# Patient Record
Sex: FEMALE | Race: WHITE | URBAN - METROPOLITAN AREA
[De-identification: names, ages, dates, MRNs, and addresses within clinical notes are randomized per-mention and may not be internally consistent; named-entity substitution may affect disease eponyms.]

---

## 2018-03-17 ENCOUNTER — OFFICE VISIT (OUTPATIENT)
Dept: FAMILY MEDICINE CLINIC | Age: 33
End: 2018-03-17
Payer: COMMERCIAL

## 2018-03-17 VITALS
DIASTOLIC BLOOD PRESSURE: 64 MMHG | SYSTOLIC BLOOD PRESSURE: 104 MMHG | HEIGHT: 68 IN | HEART RATE: 103 BPM | RESPIRATION RATE: 12 BRPM | OXYGEN SATURATION: 98 % | BODY MASS INDEX: 24.1 KG/M2 | WEIGHT: 159 LBS | TEMPERATURE: 99.2 F

## 2018-03-17 DIAGNOSIS — H65.01 RIGHT ACUTE SEROUS OTITIS MEDIA, RECURRENCE NOT SPECIFIED: Primary | ICD-10-CM

## 2018-03-17 DIAGNOSIS — J01.90 ACUTE BACTERIAL SINUSITIS: ICD-10-CM

## 2018-03-17 DIAGNOSIS — B96.89 ACUTE BACTERIAL SINUSITIS: ICD-10-CM

## 2018-03-17 DIAGNOSIS — R05.9 COUGH: ICD-10-CM

## 2018-03-17 PROCEDURE — 99213 OFFICE O/P EST LOW 20 MIN: CPT | Performed by: PHYSICIAN ASSISTANT

## 2018-03-17 RX ORDER — CEFDINIR 300 MG/1
300 CAPSULE ORAL 2 TIMES DAILY
Qty: 20 CAPSULE | Refills: 0 | Status: SHIPPED | OUTPATIENT
Start: 2018-03-17 | End: 2018-03-27

## 2018-03-17 RX ORDER — LEVOTHYROXINE SODIUM 0.07 MG/1
75 TABLET ORAL DAILY
COMMUNITY

## 2018-03-17 NOTE — PROGRESS NOTES
and bulging, left TM with mild erythema , no perforation bilaterally. Canals normal bilaterally. Some mild bilateral serous fluid noted. Nose:   There is mild bilateral injection to middle turbinates bilaterally, turbinates swollen with some thick yellow mucous noted. Some frontal and maxillary sinus tenderness bilaterally. Mouth: Normal to inspection. Throat: Mild posterior pharyngeal erythema with some thick discolored post nasal drip present. No exudate. Neck:  Supple. There is some tender anterior cervical adenopathy. No rigidity and no stridor  Lungs:   Breath sounds: Non-labored, equal, and without adventitious sounds. No current active wheezing, rales, or rhonchi. Intermittent dry cough noted on exam, no sternal retractions. Heart:  Regular rate and rhythm, normal heart sounds  Skin:  Normal turgor. Warm, dry, without visible rash. Neurological:  Alert and oriented. Motor functions intact. Responds to verbal commands. Assessment / Plan     Impression(s):  1. Right acute serous otitis media, recurrence not specified    2. Acute bacterial sinusitis    3. Cough      Disposition:  Disposition: Discharge to home  Patient advised that at this point if she did have influenza, it is too late for treatment with Tamiflu and therefore we will defer the rapid flu swab. We will defer also her rapid strep as she was exposed to her son with positive strep and she does have a right ear infection as well as a suspected early sinus infection. Will treat patient with Omnicef twice daily. She will continue taking the Advil Cold and Sinus. Recommend that she hydrated with plenty of water clear fluids. She was also advised to get some Delsym over-the-counter and take 2 teaspoons twice daily for cough. Advised caution with close family contacts at home and recommended to throw a toothbrush prior to finishing antibiotics.     Follow up with PCP in 7-10 days if not improving, sooner or to ER if changes or worse. Patient advised to take all medications as prescribed.

## 2019-05-15 ENCOUNTER — HOSPITAL ENCOUNTER (OUTPATIENT)
Age: 34
Discharge: HOME | End: 2019-05-17

## 2019-05-15 PROCEDURE — 88302 TISSUE EXAM BY PATHOLOGIST: CPT

## 2024-11-13 ENCOUNTER — HOSPITAL ENCOUNTER (OUTPATIENT)
Age: 39
Discharge: HOME | End: 2024-11-15

## 2024-11-18 LAB — SURGICAL PATHOLOGY REPORT: NORMAL

## 2025-03-05 PROCEDURE — 88305 TISSUE EXAM BY PATHOLOGIST: CPT | Performed by: DERMATOLOGY

## 2025-03-06 ENCOUNTER — LAB REQUISITION (OUTPATIENT)
Dept: DERMATOPATHOLOGY | Facility: CLINIC | Age: 40
End: 2025-03-06
Payer: COMMERCIAL

## 2025-03-06 DIAGNOSIS — D48.5 NEOPLASM OF UNCERTAIN BEHAVIOR OF SKIN: ICD-10-CM

## 2025-03-07 LAB
LABORATORY COMMENT REPORT: NORMAL
PATH REPORT.FINAL DX SPEC: NORMAL
PATH REPORT.GROSS SPEC: NORMAL
PATH REPORT.MICROSCOPIC SPEC OTHER STN: NORMAL
PATH REPORT.RELEVANT HX SPEC: NORMAL
PATH REPORT.TOTAL CANCER: NORMAL

## 2025-04-30 PROCEDURE — 88305 TISSUE EXAM BY PATHOLOGIST: CPT | Performed by: DERMATOLOGY

## 2025-05-01 ENCOUNTER — LAB REQUISITION (OUTPATIENT)
Dept: DERMATOPATHOLOGY | Facility: CLINIC | Age: 40
End: 2025-05-01
Payer: COMMERCIAL

## 2025-05-01 DIAGNOSIS — D22.5 MELANOCYTIC NEVI OF TRUNK: ICD-10-CM
